# Patient Record
Sex: MALE | Race: WHITE | NOT HISPANIC OR LATINO | URBAN - METROPOLITAN AREA
[De-identification: names, ages, dates, MRNs, and addresses within clinical notes are randomized per-mention and may not be internally consistent; named-entity substitution may affect disease eponyms.]

---

## 2018-04-27 ENCOUNTER — OFFICE VISIT - RIVER FALLS (OUTPATIENT)
Dept: FAMILY MEDICINE | Facility: CLINIC | Age: 38
End: 2018-04-27

## 2018-04-27 ASSESSMENT — MIFFLIN-ST. JEOR: SCORE: 1705.41

## 2018-12-12 ENCOUNTER — OFFICE VISIT - RIVER FALLS (OUTPATIENT)
Dept: FAMILY MEDICINE | Facility: CLINIC | Age: 38
End: 2018-12-12

## 2018-12-12 ASSESSMENT — MIFFLIN-ST. JEOR: SCORE: 1698.15

## 2022-02-11 VITALS
HEIGHT: 66 IN | HEART RATE: 76 BPM | OXYGEN SATURATION: 97 % | BODY MASS INDEX: 29.63 KG/M2 | DIASTOLIC BLOOD PRESSURE: 70 MMHG | TEMPERATURE: 98 F | SYSTOLIC BLOOD PRESSURE: 114 MMHG | WEIGHT: 184.4 LBS

## 2022-02-12 VITALS
HEIGHT: 66 IN | DIASTOLIC BLOOD PRESSURE: 76 MMHG | TEMPERATURE: 97.5 F | BODY MASS INDEX: 29.89 KG/M2 | HEART RATE: 60 BPM | SYSTOLIC BLOOD PRESSURE: 112 MMHG | WEIGHT: 186 LBS | RESPIRATION RATE: 16 BRPM

## 2022-02-15 NOTE — PROGRESS NOTES
Patient:   LORENA GRAVES            MRN: [id number]            FIN: [id number]               Age:   37 years     Sex:  Male     :  1980   Associated Diagnoses:   Encounter for examination required by Department of Transportation (DOT)   Author:   Devon BOYD, [place] LONG      Chief Complaint   2018 10:02 AM CDT   Pt here for DOT Px.        Subjective   Chief complaint 2018 10:02 AM CDT   Pt here for DOT Px.   DOT exam.     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)      Health Status   Allergies:    Allergic Reactions (Selected)  No known allergies   Medications:  (Selected)   Prescriptions  Prescribed  Allegra-D 24 Hour oral tablet, extended release: 1 tab(s), PO, Daily, PRN: as needed for allergy symptoms, # 90 EA, 3 Refill(s), Type: Maintenance, Pharmacy: [id number]   Problem list:    All Problems  Obesity / SNOMED CT 7098388286 / Probable  Seasonal Allergies / ICD-9-.9 / Axis I diagnosis  Canceled: Asthma / ICD-9-.90      Objective         Vital Signs   2018 10:02 AM CDT Temperature Tympanic 97.5 DegF  LOW    Peripheral Pulse Rate 60 bpm    Pulse Site Radial artery    Respiratory Rate 16 br/min    Systolic Blood Pressure 112 mmHg    Diastolic Blood Pressure 76 mmHg    Mean Arterial Pressure 88 mmHg    BP Site Right arm      Measurements from flowsheet : Measurements   2018 10:02 AM CDT Height Measured - Standard 66.25 in    Weight Measured - Standard 186 lb    BSA 1.98 m2    Body Mass Index 29.79 kg/m2  HI      General:  No acute distress.    Eye:  Pupils are equal, round and reactive to light, Extraocular movements are intact, visual acuity is normal, horizontal field of vision is 90 degrees bilaterally.    HENT:  Tympanic membranes are clear, No pharyngeal erythema, No sinus tenderness.    Neck:  Supple, Non-tender, No lymphadenopathy.    Respiratory:  Lungs are clear to auscultation.    Cardiovascular:  Normal rate, Regular rhythm, No  murmur.    Gastrointestinal:  Soft, Non-tender, Non-distended, Normal bowel sounds, No organomegaly.    Genitourinary:  testicles smooth symmetrical, without nodules, no rashes or lesions, no hernia present.    Musculoskeletal:  Normal range of motion.    Neurologic:  Cranial Nerves II-XII are grossly intact, Normal deep tendon reflexes, Romberg is negative.    Psychiatric:  Appropriate mood & affect.       Results Review   Results review   Urinalysis is normal      Impression and Plan   Assessment and Plan:          Diagnosis: Encounter for examination required by Department of Transportation (DOT) ([id number] Z02.89).    Orders     Orders   Charges:  9939D DOT Exam (Charge) (Order): Quantity: 1, Encounter for examination required by Department of Transportation (DOT).     Normal DOT exam; Approved for 2 years without restrictions.     .

## 2022-02-15 NOTE — PROGRESS NOTES
Patient:   LORENA GRAVES            MRN: [id number]            FIN: [id number]               Age:   38 years     Sex:  Male     :  1980   Associated Diagnoses:   Cough   Author:   Bennie Pacheco MD      Visit Information      Date of Service: 2018 02:20 pm  Performing Location: [facility]  Encounter#: [id number]      Primary Care Provider (PCP):  NONE ,       Referring Provider:  Ovi Parkinson PA-C    NPI# [id number]      Chief Complaint   2018 2:44 PM CST   c/o vertigo, cough, chills for the past week and half, slowly getting better        History of Present Illness   Two weeks ago had diarrhea with fever for several days. Things are getting better. Still has a cough. Work sent him home today. Dizziness with standing (not vertigo) has been present the whole time and is improving. Now continues with cough and dizziness. Dizziness had been triggered by standing up.       Review of Systems   Eye:  No visual disturbances.    Ear/Nose/Mouth/Throat:  No decreased hearing.    Respiratory:  No shortness of breath.    Genitourinary:  No dysuria.       Health Status   Allergies:    Allergic Reactions (Selected)  No known allergies   Medications:  (Selected)      Problem list:    All Problems  Obesity / SNOMED CT 4329150534 / Probable  Seasonal Allergies / ICD-9-.9 / Axis I diagnosis  Canceled: Asthma / ICD-9-.90      Histories   Procedure history:    No active procedure history items have been selected or recorded.     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    Eye:  Pupils are equal, round and reactive to light, Extraocular movements are intact, Normal conjunctiva.    HENT:  No pharyngeal erythema.    Respiratory:  Lungs are clear to auscultation.    Cardiovascular:  Normal rate, Regular rhythm.    Gastrointestinal:  Soft, Non-tender, Non-distended.    Musculoskeletal:  Normal gait.    Neurologic:  No focal deficits.    Psychiatric:  Appropriate mood & affect.    Neurologic: Dix-Hallpike-Epley maneuver negative      Impression and Plan   Diagnosis     Cough ([id number]).       Cough: likely post infectious cough. Continue to monitor. .Follow up if not getting better.